# Patient Record
Sex: FEMALE | Race: WHITE | NOT HISPANIC OR LATINO | Employment: FULL TIME | URBAN - METROPOLITAN AREA
[De-identification: names, ages, dates, MRNs, and addresses within clinical notes are randomized per-mention and may not be internally consistent; named-entity substitution may affect disease eponyms.]

---

## 2024-03-26 ENCOUNTER — OFFICE VISIT (OUTPATIENT)
Dept: URGENT CARE | Facility: URGENT CARE | Age: 59
End: 2024-03-26
Payer: COMMERCIAL

## 2024-03-26 VITALS
TEMPERATURE: 97 F | DIASTOLIC BLOOD PRESSURE: 81 MMHG | SYSTOLIC BLOOD PRESSURE: 144 MMHG | OXYGEN SATURATION: 97 % | HEART RATE: 68 BPM
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
## 2024-03-26 PROCEDURE — 99203 OFFICE O/P NEW LOW 30 MIN: CPT | Performed by: FAMILY MEDICINE

## 2024-03-26 RX ORDER — CEFUROXIME AXETIL 500 MG/1
500 TABLET ORAL 2 TIMES DAILY
Qty: 10 TABLET | Refills: 0 | Status: SHIPPED | OUTPATIENT
Start: 2024-03-26 | End: 2024-03-31

## 2024-03-26 RX ORDER — METRONIDAZOLE 500 MG/1
500 TABLET ORAL 3 TIMES DAILY
Qty: 15 TABLET | Refills: 0 | Status: SHIPPED | OUTPATIENT
Start: 2024-03-26 | End: 2024-03-31

## 2024-03-26 NOTE — PROGRESS NOTES
(W55.01XA) Cat bite, initial encounter  (primary encounter diagnosis)  Comment:   Plan: cefuroxime (CEFTIN) 500 MG tablet,         metroNIDAZOLE (FLAGYL) 500 MG tablet              CHIEF COMPLAINT    Bite of right index finger.      HISTORY    The patient had a few small punctures and tears at the distal portion of the right index.  This occurred 3 days ago.  She has been keeping it clean and protected.    She has had some mild increase in tenderness along the edge of the nail particularly.  She wonders about antibiotic coverage.    Also she will be traveling in a few days.      EXAM  BP (!) 144/81   Pulse 68   Temp 97  F (36.1  C) (Tympanic)   SpO2 97%     Right index finger  No lymphangitis.  3 small wounds on the distal portion of the finger maybe 4 mm each which are well opposed and there is no bleeding or purulent drainage.  There is minimal redness around this area and some mild redness near the nail.